# Patient Record
(demographics unavailable — no encounter records)

---

## 2024-10-29 NOTE — PHYSICAL EXAM
[General Appearance - Alert] : alert [General Appearance - In No Acute Distress] : in no acute distress [General Appearance - Well Nourished] : well nourished [General Appearance - Well-Appearing] : healthy appearing [Sclera] : the sclera and conjunctiva were normal [PERRL With Normal Accommodation] : pupils were equal in size, round, reactive to light [Extraocular Movements] : extraocular movements were intact [Outer Ear] : the ears and nose were normal in appearance [Oropharynx] : the oropharynx was normal with no thrush [Neck Appearance] : the appearance of the neck was normal [Jugular Venous Distention Increased] : there was no jugular-venous distention [Respiration, Rhythm And Depth] : normal respiratory rhythm and effort [Exaggerated Use Of Accessory Muscles For Inspiration] : no accessory muscle use [Auscultation Breath Sounds / Voice Sounds] : lungs were clear to auscultation bilaterally [Heart Rate And Rhythm] : heart rate was normal and rhythm regular [Heart Sounds] : normal S1 and S2 [Full Pulse] : the pedal pulses are present [Edema] : there was no peripheral edema [Bowel Sounds] : normal bowel sounds [Abdomen Soft] : soft [Abdomen Tenderness] : non-tender [Costovertebral Angle Tenderness] : no CVA tenderness [Musculoskeletal - Swelling] : no joint swelling [Range of Motion to Joints] : range of motion to joints [Nail Clubbing] : no clubbing  or cyanosis of the fingernails [Motor Tone] : muscle strength and tone were normal [Skin Color & Pigmentation] : normal skin color and pigmentation [] : no rash [Motor Exam] : the motor exam was normal [No Focal Deficits] : no focal deficits [Oriented To Time, Place, And Person] : oriented to person, place, and time [Affect] : the affect was normal

## 2024-10-29 NOTE — HISTORY OF PRESENT ILLNESS
[FreeTextEntry1] : 56-year-old male with HIV on Triumeq, and s/p hepatitis C treatment with Mavyret.  Here for follow up, he reports no missed doses. He is taking his HIV treatment. Patient reports no fever no chills. Reports no complaints.

## 2024-10-29 NOTE — ASSESSMENT
[FreeTextEntry1] : 54-year-old male with HIV here for followup  HIV We'll continue Triumeq Check CD4, HIV VL, syphilis screen and T-cell count  Hepatitis C 1a Completed Mavyret   GERD Follow up GI  Reporting dry eyes no recent eye eval Referred to Ophthalmology   Followup in  12  weeks  Counseling included lab results, differential diagnosis, treatment options, risks and benefits, lifestyle changes, current condition, medications and dose adjustments. The patient was interactive attentive and asked questions and verbalized understanding.

## 2024-11-19 NOTE — ASSESSMENT
[FreeTextEntry1] : 56-year-old male with HIV here for followup  HIV We'll continue Triumeq Check CD4, HIV VL, syphilis screen and T-cell count If VL continues to go up will need to change medications   Hepatitis C 1a Completed Mavmorteza   GERD Follow up GI   Follow up in 8 - 12  weeks  Counseling included lab results, differential diagnosis, treatment options, risks and benefits, lifestyle changes, current condition, medications and dose adjustments. The patient was interactive attentive and asked questions and verbalized understanding.

## 2024-11-19 NOTE — END OF VISIT
[Time Spent: ___ minutes] : I have spent [unfilled] minutes of time on the encounter which excludes teaching and separately reported services. 3-4 cups/cans per day

## 2025-06-24 NOTE — HISTORY OF PRESENT ILLNESS
[FreeTextEntry1] : 56-year-old male with HIV on Biktarvy and s/p hepatitis C treatment with Mavyret.  Here for follow up, he reports no missed doses. He is taking his HIV treatment. Patient reports no fever no chills. Reports difficulty initiating a stream and dribbling.

## 2025-06-24 NOTE — ASSESSMENT
[FreeTextEntry1] : 56-year-old male with HIV here for followup  HIV Hepatitis C 1a Urinary dribbling   We'll continue Biktarvy  Check CD4, HIV VL, syphilis screen and T-cell count, Hep C, GC/Chlamydia  Completed Mavyret for Hep C  Will Check PSA, UA   Follow up in 6  weeks  Counseling included lab results, differential diagnosis, treatment options, risks and benefits, lifestyle changes, current condition, medications and dose adjustments. The patient was interactive attentive and asked questions and verbalized understanding.